# Patient Record
Sex: FEMALE | Race: WHITE | NOT HISPANIC OR LATINO | Employment: FULL TIME | URBAN - METROPOLITAN AREA
[De-identification: names, ages, dates, MRNs, and addresses within clinical notes are randomized per-mention and may not be internally consistent; named-entity substitution may affect disease eponyms.]

---

## 2023-05-20 LAB
ALANINE AMINOTRANSFERASE (SGPT) (U/L) IN SER/PLAS: 13 U/L (ref 7–45)
ALBUMIN (G/DL) IN SER/PLAS: 4.1 G/DL (ref 3.4–5)
ALKALINE PHOSPHATASE (U/L) IN SER/PLAS: 75 U/L (ref 33–110)
ANION GAP IN SER/PLAS: 10 MMOL/L (ref 10–20)
ASPARTATE AMINOTRANSFERASE (SGOT) (U/L) IN SER/PLAS: 12 U/L (ref 9–39)
BILIRUBIN TOTAL (MG/DL) IN SER/PLAS: 0.4 MG/DL (ref 0–1.2)
CALCIDIOL (25 OH VITAMIN D3) (NG/ML) IN SER/PLAS: 27 NG/ML
CALCIUM (MG/DL) IN SER/PLAS: 9.2 MG/DL (ref 8.6–10.6)
CARBON DIOXIDE, TOTAL (MMOL/L) IN SER/PLAS: 28 MMOL/L (ref 21–32)
CHLORIDE (MMOL/L) IN SER/PLAS: 107 MMOL/L (ref 98–107)
CORTISOL (UG/DL) IN SERUM - AM: 13.2 UG/DL (ref 5–20)
CREATININE (MG/DL) IN SER/PLAS: 0.96 MG/DL (ref 0.5–1.05)
DEHYDROEPIANDROSTERONE SULFATE (DHEA-S) (UG/DL) IN SER/: 169 UG/DL (ref 12–379)
ESTIMATED AVERAGE GLUCOSE FOR HBA1C: 103 MG/DL
ESTRADIOL (PG/ML) IN SER/PLAS: 52 PG/ML
FOLLITROPIN (IU/L) IN SER/PLAS: 7.6 IU/L
GFR FEMALE: 77 ML/MIN/1.73M2
GLUCOSE (MG/DL) IN SER/PLAS: 82 MG/DL (ref 74–99)
HEMOGLOBIN A1C/HEMOGLOBIN TOTAL IN BLOOD: 5.2 %
INSULIN: 11 UIU/ML (ref 3–25)
LUTEINIZING HORMONE (IU/ML) IN SER/PLAS: 7.9 IU/L
POTASSIUM (MMOL/L) IN SER/PLAS: 4.5 MMOL/L (ref 3.5–5.3)
PROLACTIN (UG/L) IN SER/PLAS: 8.9 UG/L (ref 3–20)
PROTEIN TOTAL: 6.7 G/DL (ref 6.4–8.2)
SODIUM (MMOL/L) IN SER/PLAS: 140 MMOL/L (ref 136–145)
THYROTROPIN (MIU/L) IN SER/PLAS BY DETECTION LIMIT <= 0.05 MIU/L: 2.16 MIU/L (ref 0.44–3.98)
THYROXINE (T4) FREE (NG/DL) IN SER/PLAS: 1.67 NG/DL (ref 0.78–1.48)
TRIIODOTHYRONINE (T3) (NG/DL) IN SER/PLAS: 134 NG/DL (ref 60–200)
UREA NITROGEN (MG/DL) IN SER/PLAS: 17 MG/DL (ref 6–23)

## 2023-05-24 LAB — ADRENOCORTICOTROPIC HORMONE: 24.4 PG/ML (ref 7.2–63.3)

## 2023-05-29 LAB
TESTOSTERONE FREE (CHAN): 2.9 PG/ML (ref 0.1–6.4)
TESTOSTERONE,TOTAL,LC-MS/MS: 26 NG/DL (ref 2–45)

## 2023-10-14 ENCOUNTER — LAB (OUTPATIENT)
Dept: LAB | Facility: LAB | Age: 38
End: 2023-10-14
Payer: COMMERCIAL

## 2023-10-14 DIAGNOSIS — Z00.00 ENCOUNTER FOR GENERAL ADULT MEDICAL EXAMINATION WITHOUT ABNORMAL FINDINGS: Primary | ICD-10-CM

## 2023-10-14 PROCEDURE — 84402 ASSAY OF FREE TESTOSTERONE: CPT

## 2023-10-14 PROCEDURE — 36415 COLL VENOUS BLD VENIPUNCTURE: CPT

## 2023-10-19 LAB
TESTOSTERONE FREE (CHAN): 2.7 PG/ML (ref 0.1–6.4)
TESTOSTERONE,TOTAL,LC-MS/MS: 25 NG/DL (ref 2–45)

## 2023-10-21 PROBLEM — E88.819 INSULIN RESISTANCE: Status: ACTIVE | Noted: 2023-10-21

## 2023-10-21 PROBLEM — E56.9 VITAMIN DEFICIENCY: Status: ACTIVE | Noted: 2023-10-21

## 2023-10-21 PROBLEM — N92.6 MISSED MENSES: Status: ACTIVE | Noted: 2023-10-21

## 2023-10-21 PROBLEM — E66.3 OVERWEIGHT WITH BODY MASS INDEX (BMI) OF 26 TO 26.9 IN ADULT: Status: ACTIVE | Noted: 2023-10-21

## 2023-10-21 PROBLEM — G43.009 MIGRAINE WITHOUT AURA AND WITHOUT STATUS MIGRAINOSUS, NOT INTRACTABLE: Status: ACTIVE | Noted: 2023-10-21

## 2023-10-21 PROBLEM — H18.832 RECURRENT EROSION OF CORNEA, LEFT EYE: Status: ACTIVE | Noted: 2023-10-21

## 2023-10-21 PROBLEM — Z34.90 PREGNANCY (HHS-HCC): Status: ACTIVE | Noted: 2023-10-21

## 2023-10-21 PROBLEM — N92.6 MISSED PERIOD: Status: ACTIVE | Noted: 2023-10-21

## 2023-10-21 PROBLEM — E03.9 PRIMARY HYPOTHYROIDISM: Status: ACTIVE | Noted: 2023-10-21

## 2023-10-21 PROBLEM — R68.82 DECREASED LIBIDO: Status: ACTIVE | Noted: 2023-10-21

## 2023-10-21 PROBLEM — H10.9 CONJUNCTIVITIS, LEFT EYE: Status: ACTIVE | Noted: 2023-10-21

## 2023-10-21 PROBLEM — N89.8 VAGINAL DISCHARGE: Status: ACTIVE | Noted: 2023-10-21

## 2023-10-21 PROBLEM — H26.9 CATARACT, CORTICAL, LEFT EYE: Status: ACTIVE | Noted: 2023-10-21

## 2023-10-21 PROBLEM — E27.49 ADRENAL SUPPRESSION (MULTI): Status: ACTIVE | Noted: 2023-10-21

## 2023-10-21 PROBLEM — D25.9 UTERINE FIBROID: Status: ACTIVE | Noted: 2023-10-21

## 2023-10-21 PROBLEM — F52.0 HYPOACTIVE SEXUAL DESIRE DISORDER: Status: ACTIVE | Noted: 2023-10-21

## 2023-10-21 PROBLEM — H57.89 IRRITATION OF LEFT EYE: Status: ACTIVE | Noted: 2023-10-21

## 2023-10-21 PROBLEM — N97.9 FEMALE FERTILITY PROBLEMS: Status: ACTIVE | Noted: 2023-10-21

## 2023-10-21 PROBLEM — G43.909 HEADACHE, MIGRAINE: Status: ACTIVE | Noted: 2023-10-21

## 2023-10-21 PROBLEM — F52.31 FEMALE ORGASMIC DISORDER: Status: ACTIVE | Noted: 2023-10-21

## 2023-10-21 RX ORDER — MULTIVITAMIN
TABLET ORAL
COMMUNITY
Start: 2022-12-09

## 2023-10-21 RX ORDER — FLIBANSERIN 100 MG/1
1 TABLET, FILM COATED ORAL NIGHTLY
COMMUNITY
Start: 2023-06-26 | End: 2023-12-17 | Stop reason: WASHOUT

## 2023-10-21 RX ORDER — TOPIRAMATE 25 MG/1
TABLET ORAL
COMMUNITY
Start: 2022-12-28 | End: 2023-12-01 | Stop reason: WASHOUT

## 2023-10-24 ENCOUNTER — TELEMEDICINE (OUTPATIENT)
Dept: OBSTETRICS AND GYNECOLOGY | Facility: CLINIC | Age: 38
End: 2023-10-24
Payer: COMMERCIAL

## 2023-10-24 DIAGNOSIS — Z79.890 MENOPAUSAL SYNDROME ON HORMONE REPLACEMENT THERAPY: ICD-10-CM

## 2023-10-24 DIAGNOSIS — N95.1 MENOPAUSAL SYNDROME ON HORMONE REPLACEMENT THERAPY: ICD-10-CM

## 2023-10-24 DIAGNOSIS — F52.0 HYPOACTIVE SEXUAL DESIRE DISORDER: Primary | ICD-10-CM

## 2023-10-24 PROCEDURE — 99441 PR PHYS/QHP TELEPHONE EVALUATION 5-10 MIN: CPT | Performed by: NURSE PRACTITIONER

## 2023-10-24 RX ORDER — TESTOSTERONE 20.25 MG/1.25G
GEL TOPICAL
Qty: 75 G | Refills: 0 | Status: SHIPPED | OUTPATIENT
Start: 2023-10-24

## 2023-10-24 ASSESSMENT — ENCOUNTER SYMPTOMS
PSYCHIATRIC NEGATIVE: 0
MUSCULOSKELETAL NEGATIVE: 0
GASTROINTESTINAL NEGATIVE: 0
EYES NEGATIVE: 0
RESPIRATORY NEGATIVE: 0
NEUROLOGICAL NEGATIVE: 0
ALLERGIC/IMMUNOLOGIC NEGATIVE: 0
ENDOCRINE NEGATIVE: 0
HEMATOLOGIC/LYMPHATIC NEGATIVE: 0
CONSTITUTIONAL NEGATIVE: 0
CARDIOVASCULAR NEGATIVE: 0

## 2023-10-24 NOTE — PROGRESS NOTES
Subjective   Patient ID: Tomasa Alcaraz is a 38 y.o. female who presents for Follow-up.  HPI  Addyi discontinued  Normal baseline testosterone labs; would like to try testosterone for HSDD  Contraception:  has a vasectomy  Review of Systems    Objective   Physical Exam    Assessment/Plan   Diagnoses and all orders for this visit:  Hypoactive sexual desire disorder  -     testosterone 20.25 mg/1.25 gram (1.62 %) gel in metered-dose pump; Apply one pump weekly to the back of your leg or inner thigh. Patient is aware that testosterone is not FDA approved for women and plans to use a Good Rx coupon. Please do not send a PA  Menopausal syndrome on hormone replacement therapy  -     Testosterone,Free and Total; Future

## 2023-12-01 ENCOUNTER — OFFICE VISIT (OUTPATIENT)
Dept: PRIMARY CARE | Facility: CLINIC | Age: 38
End: 2023-12-01
Payer: COMMERCIAL

## 2023-12-01 VITALS
RESPIRATION RATE: 16 BRPM | OXYGEN SATURATION: 98 % | HEART RATE: 80 BPM | HEIGHT: 64 IN | WEIGHT: 150 LBS | DIASTOLIC BLOOD PRESSURE: 78 MMHG | TEMPERATURE: 97.1 F | BODY MASS INDEX: 25.61 KG/M2 | SYSTOLIC BLOOD PRESSURE: 121 MMHG

## 2023-12-01 DIAGNOSIS — Z12.31 ENCOUNTER FOR SCREENING MAMMOGRAM FOR BREAST CANCER: ICD-10-CM

## 2023-12-01 DIAGNOSIS — E27.49 ADRENAL SUPPRESSION (MULTI): ICD-10-CM

## 2023-12-01 DIAGNOSIS — Z00.00 ANNUAL PHYSICAL EXAM: Primary | ICD-10-CM

## 2023-12-01 PROCEDURE — 99396 PREV VISIT EST AGE 40-64: CPT | Performed by: FAMILY MEDICINE

## 2023-12-01 PROCEDURE — 1036F TOBACCO NON-USER: CPT | Performed by: FAMILY MEDICINE

## 2023-12-01 ASSESSMENT — PROMIS GLOBAL HEALTH SCALE
RATE_PHYSICAL_HEALTH: GOOD
RATE_AVERAGE_FATIGUE: MILD
CARRYOUT_PHYSICAL_ACTIVITIES: COMPLETELY
CARRYOUT_SOCIAL_ACTIVITIES: VERY GOOD
RATE_QUALITY_OF_LIFE: VERY GOOD
RATE_GENERAL_HEALTH: GOOD
RATE_MENTAL_HEALTH: GOOD
RATE_AVERAGE_PAIN: 0
RATE_SOCIAL_SATISFACTION: GOOD
EMOTIONAL_PROBLEMS: SOMETIMES

## 2023-12-01 NOTE — PROGRESS NOTES
"Subjective   Patient ID: Tomasa Alcaraz is a 38 y.o. female who presents for Annual Exam.  Very pleasant 38-year-old here today for annual wellness exam  Has history of adrenal suppression sees endocrinologist  Works outside the home is not a smoker  No new family history of concern  No  issues has cycles are regular  Sees gynecologist regularly  No angina palpitations or syncope no fever chills or night sweats        Review of Systems  Constitutional: no chills, no fever and no night sweats.   Eyes: no blurred vision and no eyesight problems.   ENT: no hearing loss, no nasal congestion, no nasal discharge, no hoarseness and no sore throat.   Cardiovascular: no chest pain, no intermittent leg claudication, no lower extremity edema, no palpitations and no syncope.   Respiratory: no cough, no shortness of breath during exertion, no shortness of breath at rest and no wheezing.   Gastrointestinal: no abdominal pain, no blood in stools, no constipation, no diarrhea, no melena, no nausea, no rectal pain and no vomiting.   Genitourinary: no dysuria, no change in urinary frequency, no urinary hesitancy, no feelings of urinary urgency and no vaginal discharge.   Musculoskeletal: no arthralgias,  no back pain and no myalgias.   Integumentary: no new skin lesions and no rashes.   Neurological: no difficulty walking, no headache, no limb weakness, no numbness and no tingling.   Psychiatric: no anxiety, no depression, no anhedonia and no substance use disorders.   Endocrine: no recent weight gain and no recent weight loss.   Hematologic/Lymphatic: no tendency for easy bruising and no swollen glands .    Objective    /78 (BP Location: Right arm, Patient Position: Sitting, BP Cuff Size: Large adult)   Pulse 80   Temp 36.2 °C (97.1 °F) (Temporal)   Resp 16   Ht 1.626 m (5' 4\")   Wt 68 kg (150 lb)   SpO2 98%   BMI 25.75 kg/m²    Physical Exam  The patient appeared well nourished and normally developed. Vital signs as " documented. Head exam is unremarkable. No scleral icterus or corneal arcus noted.  Pupils are equal round reactive to light extraocular movements are intact no hemorrhages noted on funduscopic exam mouth mucous membranes are moist no exudates ears canals clear TMs are gray pearly not injected nose no rhinorrhea or epistaxis Neck is without jugular venous distension, thyromegaly, or carotid bruits. Carotid upstrokes are brisk bilaterally. Lungs are clear to auscultation and percussion. Cardiac exam reveals the PMI to be normally sized and situated. Rhythm is regular. First and second heart sounds normal. No murmurs, rubs or gallops. Abdominal exam reveals normal bowel sounds, no masses, no organomegaly and no aortic enlargement. Extremities are nonedematous and both femoral and pedal pulses are normal.  Neurologic exam DTRs are equal bilaterally no focal deficits strength is symmetrical heme lymph no palpable lymph nodes in the neck axilla or groin    Assessment/Plan   Problem List Items Addressed This Visit       Adrenal suppression (CMS/HCC)     Stable based on symptoms and exam  Continue to follow with endocrinologist         Annual physical exam - Primary     Unremarkable physical exam  Continue to follow-up once a year         Relevant Orders    Comprehensive Metabolic Panel (Completed)    Lipid Panel (Completed)    Hemoglobin A1C (Completed)    Hepatitis C antibody (Completed)    CBC (Completed)     Other Visit Diagnoses       Encounter for screening mammogram for breast cancer        Relevant Orders    BI mammo bilateral screening tomosynthesis                 Blaire Frank MD

## 2023-12-07 PROCEDURE — RXMED WILLOW AMBULATORY MEDICATION CHARGE

## 2023-12-08 ENCOUNTER — PHARMACY VISIT (OUTPATIENT)
Dept: PHARMACY | Facility: CLINIC | Age: 38
End: 2023-12-08
Payer: COMMERCIAL

## 2023-12-08 ENCOUNTER — LAB (OUTPATIENT)
Dept: LAB | Facility: LAB | Age: 38
End: 2023-12-08
Payer: COMMERCIAL

## 2023-12-08 DIAGNOSIS — N95.1 MENOPAUSAL SYNDROME ON HORMONE REPLACEMENT THERAPY: ICD-10-CM

## 2023-12-08 DIAGNOSIS — Z79.890 MENOPAUSAL SYNDROME ON HORMONE REPLACEMENT THERAPY: ICD-10-CM

## 2023-12-08 DIAGNOSIS — Z00.00 ANNUAL PHYSICAL EXAM: ICD-10-CM

## 2023-12-08 LAB
ALBUMIN SERPL BCP-MCNC: 4.3 G/DL (ref 3.4–5)
ALP SERPL-CCNC: 62 U/L (ref 33–110)
ALT SERPL W P-5'-P-CCNC: 16 U/L (ref 7–45)
ANION GAP SERPL CALC-SCNC: 11 MMOL/L (ref 10–20)
AST SERPL W P-5'-P-CCNC: 12 U/L (ref 9–39)
BILIRUB SERPL-MCNC: 0.4 MG/DL (ref 0–1.2)
BUN SERPL-MCNC: 17 MG/DL (ref 6–23)
CALCIUM SERPL-MCNC: 9.4 MG/DL (ref 8.6–10.6)
CHLORIDE SERPL-SCNC: 107 MMOL/L (ref 98–107)
CHOLEST SERPL-MCNC: 191 MG/DL (ref 0–199)
CHOLESTEROL/HDL RATIO: 4.9
CO2 SERPL-SCNC: 26 MMOL/L (ref 21–32)
CREAT SERPL-MCNC: 0.84 MG/DL (ref 0.5–1.05)
ERYTHROCYTE [DISTWIDTH] IN BLOOD BY AUTOMATED COUNT: 12.2 % (ref 11.5–14.5)
EST. AVERAGE GLUCOSE BLD GHB EST-MCNC: 94 MG/DL
GFR SERPL CREATININE-BSD FRML MDRD: 90 ML/MIN/1.73M*2
GLUCOSE SERPL-MCNC: 84 MG/DL (ref 74–99)
HBA1C MFR BLD: 4.9 %
HCT VFR BLD AUTO: 41.4 % (ref 36–46)
HCV AB SER QL: NONREACTIVE
HDLC SERPL-MCNC: 39 MG/DL
HGB BLD-MCNC: 13.9 G/DL (ref 12–16)
LDLC SERPL CALC-MCNC: 131 MG/DL
MCH RBC QN AUTO: 32.3 PG (ref 26–34)
MCHC RBC AUTO-ENTMCNC: 33.6 G/DL (ref 32–36)
MCV RBC AUTO: 96 FL (ref 80–100)
NON HDL CHOLESTEROL: 152 MG/DL (ref 0–149)
NRBC BLD-RTO: 0 /100 WBCS (ref 0–0)
PLATELET # BLD AUTO: 295 X10*3/UL (ref 150–450)
POTASSIUM SERPL-SCNC: 4.3 MMOL/L (ref 3.5–5.3)
PROT SERPL-MCNC: 6.8 G/DL (ref 6.4–8.2)
RBC # BLD AUTO: 4.31 X10*6/UL (ref 4–5.2)
SODIUM SERPL-SCNC: 140 MMOL/L (ref 136–145)
TRIGL SERPL-MCNC: 104 MG/DL (ref 0–149)
VLDL: 21 MG/DL (ref 0–40)
WBC # BLD AUTO: 5.8 X10*3/UL (ref 4.4–11.3)

## 2023-12-08 PROCEDURE — 84402 ASSAY OF FREE TESTOSTERONE: CPT

## 2023-12-08 PROCEDURE — 86803 HEPATITIS C AB TEST: CPT

## 2023-12-08 PROCEDURE — 80061 LIPID PANEL: CPT

## 2023-12-08 PROCEDURE — 80053 COMPREHEN METABOLIC PANEL: CPT

## 2023-12-08 PROCEDURE — 85027 COMPLETE CBC AUTOMATED: CPT

## 2023-12-08 PROCEDURE — 36415 COLL VENOUS BLD VENIPUNCTURE: CPT

## 2023-12-08 PROCEDURE — 83036 HEMOGLOBIN GLYCOSYLATED A1C: CPT

## 2023-12-12 LAB
TESTOSTERONE FREE (CHAN): 2 PG/ML (ref 0.1–6.4)
TESTOSTERONE,TOTAL,LC-MS/MS: 23 NG/DL (ref 2–45)

## 2023-12-15 ENCOUNTER — TELEPHONE (OUTPATIENT)
Dept: OBSTETRICS AND GYNECOLOGY | Facility: CLINIC | Age: 38
End: 2023-12-15
Payer: COMMERCIAL

## 2023-12-15 NOTE — TELEPHONE ENCOUNTER
Pt returned call  Pt verified by name and .  Pt is aware of Hunter Lincoln's message:  Nurse left message for pt to return call:  EVA Hernandez-RIN Cedeño RN  Can you please let her know that her follow up testosterone levels are within normal limits and inquire how the therapy is going?  Pt states she is not having any side effect like she did I with Addyi.  Pt is aware nurse will send Hunter Lincoln a message:

## 2023-12-15 NOTE — TELEPHONE ENCOUNTER
Nurse left message for pt to return call:  EVA Hernandez-RIN Cedeño RN  Can you please let her know that her follow up testosterone levels are within normal limits and inquire how the therapy is going?

## 2023-12-17 PROBLEM — Z00.00 ANNUAL PHYSICAL EXAM: Status: ACTIVE | Noted: 2023-12-17

## 2023-12-19 RX ORDER — DOXYCYCLINE HYCLATE 100 MG
100 TABLET ORAL 2 TIMES DAILY
COMMUNITY
Start: 2023-10-27

## 2023-12-19 RX ORDER — TOBRAMYCIN AND DEXAMETHASONE 3; 1 MG/G; MG/G
OINTMENT OPHTHALMIC
COMMUNITY
Start: 2023-10-27 | End: 2024-04-24 | Stop reason: ALTCHOICE

## 2023-12-27 ENCOUNTER — HOSPITAL ENCOUNTER (OUTPATIENT)
Dept: RADIOLOGY | Facility: HOSPITAL | Age: 38
Discharge: HOME | End: 2023-12-27
Payer: COMMERCIAL

## 2023-12-27 DIAGNOSIS — Z12.31 ENCOUNTER FOR SCREENING MAMMOGRAM FOR BREAST CANCER: ICD-10-CM

## 2023-12-27 PROCEDURE — 77063 BREAST TOMOSYNTHESIS BI: CPT

## 2023-12-27 PROCEDURE — 77067 SCR MAMMO BI INCL CAD: CPT | Performed by: RADIOLOGY

## 2023-12-27 PROCEDURE — 77063 BREAST TOMOSYNTHESIS BI: CPT | Performed by: RADIOLOGY

## 2024-01-09 ENCOUNTER — OFFICE VISIT (OUTPATIENT)
Dept: ENDOCRINOLOGY | Facility: HOSPITAL | Age: 39
End: 2024-01-09
Payer: COMMERCIAL

## 2024-01-09 VITALS
SYSTOLIC BLOOD PRESSURE: 106 MMHG | HEART RATE: 74 BPM | WEIGHT: 155.3 LBS | BODY MASS INDEX: 26.51 KG/M2 | HEIGHT: 64 IN | DIASTOLIC BLOOD PRESSURE: 78 MMHG | TEMPERATURE: 97.7 F | OXYGEN SATURATION: 97 %

## 2024-01-09 DIAGNOSIS — E56.9 VITAMIN DEFICIENCY: ICD-10-CM

## 2024-01-09 DIAGNOSIS — E03.9 PRIMARY HYPOTHYROIDISM: Primary | ICD-10-CM

## 2024-01-09 PROBLEM — E27.49 ADRENAL SUPPRESSION (MULTI): Status: RESOLVED | Noted: 2023-10-21 | Resolved: 2024-01-09

## 2024-01-09 PROCEDURE — 99215 OFFICE O/P EST HI 40 MIN: CPT | Performed by: STUDENT IN AN ORGANIZED HEALTH CARE EDUCATION/TRAINING PROGRAM

## 2024-01-09 PROCEDURE — 1036F TOBACCO NON-USER: CPT | Performed by: STUDENT IN AN ORGANIZED HEALTH CARE EDUCATION/TRAINING PROGRAM

## 2024-01-09 ASSESSMENT — PAIN SCALES - GENERAL: PAINLEVEL: 0-NO PAIN

## 2024-01-09 ASSESSMENT — PATIENT HEALTH QUESTIONNAIRE - PHQ9
SUM OF ALL RESPONSES TO PHQ9 QUESTIONS 1 & 2: 0
2. FEELING DOWN, DEPRESSED OR HOPELESS: NOT AT ALL
1. LITTLE INTEREST OR PLEASURE IN DOING THINGS: NOT AT ALL

## 2024-01-09 ASSESSMENT — ENCOUNTER SYMPTOMS
LOSS OF SENSATION IN FEET: 0
OCCASIONAL FEELINGS OF UNSTEADINESS: 0
DEPRESSION: 0

## 2024-01-09 NOTE — PROGRESS NOTES
"Subjective   Tomasa Alcaraz is a 38 y.o. female with hx of hypothyroidism coming in for follow up.  Patient used to follow with Dr. Hooks  On Lt4 112 mcg 8 pills a week takes it appropriately  Started on testosterone by Gynecology for low libido (Androgel once a week one pump) Labs checked 2 days after  Was having headaches saw neurology had MRI negative took migraine treatment   Was previously on topiramate but was not feeling good on it  Constantly tired  Vitamin D 2000IU daily  Energy:  No naps feels tired   Sleep: 9 hours of sleep. Wakes up twice a night   Weight: Lost 11 lbs from June to December gained 5 lbs back  BM:No constipation or diarrhea   Cold or heat intolerance: Same  Palpitations or tremors: Occasional palpitations Not always with anxiety  Menses or HF or NS: Regular period. No HF no night sweats  Cramps: No  Tingling numbness: No  Compressive symptoms:  - Hoarseness: No  - SOB while lying flat: No  - Dysphagia: No  Family history of thyroid cancer: No   History of head or neck radiation: No    No family hx of thyroid disease or autoimmune disease  Breast cancer in her father's family    Review of Systems  all pertinent systems reviewed and are otherwise negative   Objective   /78 (BP Location: Left arm, Patient Position: Sitting, BP Cuff Size: Adult)   Pulse 74   Temp 36.5 °C (97.7 °F) (Temporal)   Ht 1.626 m (5' 4\")   Wt 70.4 kg (155 lb 4.8 oz)   SpO2 97%   BMI 26.66 kg/m²    Physical Exam  Constitutional:       General: She is not in acute distress.     Appearance: Normal appearance.   Eyes:      Extraocular Movements: Extraocular movements intact.      Pupils: Pupils are equal, round, and reactive to light.   Cardiovascular:      Rate and Rhythm: Normal rate and regular rhythm.   Pulmonary:      Effort: Pulmonary effort is normal. No respiratory distress.      Breath sounds: Normal breath sounds.   Abdominal:      General: Bowel sounds are normal.      Palpations: Abdomen is soft.    "   Tenderness: There is no abdominal tenderness.   Skin:     Coloration: Skin is not jaundiced or pale.      Findings: No erythema or rash.   Neurological:      General: No focal deficit present.      Mental Status: She is alert and oriented to person, place, and time.      Deep Tendon Reflexes: Reflexes normal.      Comments: Slight tremors    Psychiatric:         Mood and Affect: Mood normal.         Behavior: Behavior normal.         Lab Results   Component Value Date    TSH 2.16 05/20/2023    FREET4 1.67 (H) 05/20/2023      Latest Reference Range & Units 05/20/23 08:06 10/14/23 10:23 12/08/23 10:37   FOLLICLE STIMULATING HORMONE IU/L 7.6     Hemoglobin A1C see below % 5.2  4.9   Thyroxine, Free 0.78 - 1.48 ng/dL 1.67 (H)     Triiodothyronine 60 - 200 ng/dL 134     Insulin 3 - 25 uIU/mL 11     LH IU/L 7.9     PROLACTIN 3.0 - 20.0 ug/L 8.9     Thyroid Stimulating Hormone 0.44 - 3.98 mIU/L 2.16     Vitamin D, 25-Hydroxy, Total ng/mL 27 !     DHEA Sulfate 12 - 379 ug/dL 169     Estradiol pg/mL 52     Testosterone, Free 0.1 - 6.4 pg/mL 2.9 2.7 2.0   GLUCOSE 74 - 99 mg/dL 82  84   Cortisol  A.M. 5.0 - 20.0 ug/dL 13.2     Estimated Average Glucose Not Established mg/dL 103  94   Testosterone, Total, LC-MS/MS 2 - 45 ng/dL 26 25 23   Adrenocorticotropic Hormone (ACTH) 7.2 - 63.3 pg/mL 24.4     (H): Data is abnormally high  !: Data is abnormal  Assessment/Plan   Mrs. Alcaraz is a 38 year old F with hx of hypothyroidism coming for follow up  Patient used to follow with Dr. Hooks  On Lt4 112 mcg 8 pills a week takes it appropriately  Started on testosterone by Gynecology for low libido (Androgel once a week one pump) Labs checked 2 days after  Was having headaches saw neurology had MRI negative took migraine treatment   Was previously on topiramate but was not feeling good on it  Constantly tired  Vitamin D 2000IU daily  Energy:  No naps feels tired   Sleep: 9 hours of sleep. Wakes up twice a night   Weight: Lost 11 lbs from  June to December gained 5 lbs back  Last TSH 2.16 5/20/2023  Plan:  Continue Levothyroxine 112 mcg daily except Sunday take 2 pills  to be taken on an empty stomach on its own 30min before other meds or food   Continue vitamin D 2000 international units  daily   Blood work when you can    RTC in 6-8 months

## 2024-01-09 NOTE — PATIENT INSTRUCTIONS
Continue Levothyroxine 112 mcg daily except Sunday take 2 pills  to be taken on an empty stomach on its own 30min before other meds or food   Continue vitamin D 2000 international units  daily   Blood work when you can    RTC in 6-8 months

## 2024-01-20 DIAGNOSIS — R92.8 ABNORMAL MAMMOGRAM: Primary | ICD-10-CM

## 2024-01-25 ENCOUNTER — HOSPITAL ENCOUNTER (OUTPATIENT)
Dept: RADIOLOGY | Facility: HOSPITAL | Age: 39
Discharge: HOME | End: 2024-01-25
Payer: COMMERCIAL

## 2024-01-25 DIAGNOSIS — R92.8 ABNORMAL MAMMOGRAM: ICD-10-CM

## 2024-01-25 PROCEDURE — 76642 ULTRASOUND BREAST LIMITED: CPT | Mod: LEFT SIDE | Performed by: RADIOLOGY

## 2024-01-25 PROCEDURE — 76981 USE PARENCHYMA: CPT | Mod: LT,RT

## 2024-01-25 PROCEDURE — 77061 BREAST TOMOSYNTHESIS UNI: CPT | Mod: LEFT SIDE | Performed by: RADIOLOGY

## 2024-01-25 PROCEDURE — 77065 DX MAMMO INCL CAD UNI: CPT | Mod: LEFT SIDE | Performed by: RADIOLOGY

## 2024-01-25 PROCEDURE — 77061 BREAST TOMOSYNTHESIS UNI: CPT | Mod: LT

## 2024-01-25 PROCEDURE — 76642 ULTRASOUND BREAST LIMITED: CPT | Mod: LT

## 2024-01-31 ENCOUNTER — OFFICE VISIT (OUTPATIENT)
Dept: NEUROLOGY | Facility: CLINIC | Age: 39
End: 2024-01-31
Payer: COMMERCIAL

## 2024-01-31 ENCOUNTER — TELEPHONE (OUTPATIENT)
Dept: PRIMARY CARE | Facility: CLINIC | Age: 39
End: 2024-01-31

## 2024-01-31 VITALS
BODY MASS INDEX: 26.66 KG/M2 | SYSTOLIC BLOOD PRESSURE: 109 MMHG | HEART RATE: 60 BPM | DIASTOLIC BLOOD PRESSURE: 70 MMHG | HEIGHT: 64 IN

## 2024-01-31 DIAGNOSIS — G43.109 MIGRAINE WITH VISUAL AURA: Primary | ICD-10-CM

## 2024-01-31 DIAGNOSIS — R92.8 ABNORMAL MAMMOGRAM: Primary | ICD-10-CM

## 2024-01-31 PROCEDURE — RXMED WILLOW AMBULATORY MEDICATION CHARGE

## 2024-01-31 PROCEDURE — 1036F TOBACCO NON-USER: CPT | Performed by: PSYCHIATRY & NEUROLOGY

## 2024-01-31 PROCEDURE — 99213 OFFICE O/P EST LOW 20 MIN: CPT | Performed by: PSYCHIATRY & NEUROLOGY

## 2024-01-31 RX ORDER — RIZATRIPTAN BENZOATE 10 MG/1
10 TABLET ORAL ONCE AS NEEDED
Qty: 9 TABLET | Refills: 5 | Status: SHIPPED | OUTPATIENT
Start: 2024-01-31 | End: 2024-05-26

## 2024-01-31 NOTE — PATIENT INSTRUCTIONS
I'm glad to hear that your migraine headache frequency remains low.    However, we discussed that naratriptan often takes about an hour to work.  I recommend trying a different triptan to see if it will kick in more quickly for headache relief.    I am prescribing rizatriptan (Maxalt) 10 mg.  Take this as you would take naratriptan, at the onset of headache (or the onset of visual aura).  You can repeat a dose after 2 hours if the first dose is not adequately effective.  Do not take more than 3 doses in 24 hours.    Contact my office if rizatriptan is not effective or not well-tolerated.  There are several other options, or we can go back to naratriptan if you prefer.    Otherwise please see me in 8 months.

## 2024-01-31 NOTE — PROGRESS NOTES
Nilton Alcaraz is a 38 y.o. year old female seen in follow-up for migraine with and without visual aura.    HPI    38-year-old right-handed  woman with past medical history significant for Hashimoto's thyroiditis with associated hypothyroidism on replacement, vitamin D deficiency, childbirth x2.     I evaluated her initially and most recently on 5/22/2023.  As detailed in my ambulatory EMR note from that date she has a history of headaches dating to high school.  She has experienced a relatively low headache frequency and at the time of the initial visit did not endorse aura.  She seems to recall a sublingual triptan trial (unclear which agent) that she found too strong and accordingly I recommended a trial of naratriptan 1 mg strength.  Her frequency of headache was not to the point of warranting a daily preventive agent.    At the time I saw her initially she was pending a brain MRI recommended by her endocrinologist due to hormonal dysfunction with question of a pituitary process.  The MRI was an unremarkable study.    She is evaluated again today in the office.    She continues to experience a low headache frequency of roughly 3 days/month that require use of a triptan.  Naratriptan 1 mg has some efficacy but she indicates that it takes about an hour for it to work.  She does not perceive drowsiness or other noted side effects although her migraines in general make her drowsy.    Identified headache triggers include particularly stress and lack of sleep.  Her last bad exacerbation occurred after a long shift with inadequate sleep.    She has had occasional instances on further questioning today corresponding to visual aura.  This includes the most recent bad headache during which she noted darkening of the OS temporal hemifield for about 1-2 minutes.  She seems to recall occasional similar visual aura episodes in the past.    She is not taking nutraceuticals or conventional preventive  headache medications.    She denies visual complaints apart from during the aura.  She denies episodes of vertigo.    On further questioning she indicates never having tried any other triptan than naratriptan, and is fairly certain that the medication we discussed at the initial visit which she found too strong was a formulation of topiramate.    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    Cataract, cortical, left eye    Decreased libido    Female fertility problems    Headache, migraine    Hypoactive sexual desire disorder    Insulin resistance    Conjunctivitis, left eye    Irritation of left eye    Migraine without aura and without status migrainosus, not intractable    Missed period    Primary hypothyroidism    Recurrent erosion of cornea, left eye    Uterine fibroid    Vaginal discharge    Vitamin deficiency    Pregnancy    Overweight with body mass index (BMI) of 26 to 26.9 in adult    Missed menses    Annual physical exam     Past Medical History:   Diagnosis Date    Disease of thyroid gland 2011    Personal history of other endocrine, nutritional and metabolic disease 06/02/2014    History of Hashimoto thyroiditis     Past Surgical History:   Procedure Laterality Date    OTHER SURGICAL HISTORY  06/02/2014    Dental Surgery    WISDOM TOOTH EXTRACTION  2005     Social History     Tobacco Use    Smoking status: Never    Smokeless tobacco: Never   Substance Use Topics    Alcohol use: Yes     Comment: Social very infrequent     family history includes Diabetes in her father; Heart disease in her father; Hypertension in her father; Mental illness in her mother.    Current Outpatient Medications:     cholecalciferol (Vitamin D-3) 50 MCG (2000 UT) tablet, TAKE 1 TABLET BY MOUTH DAILY, Disp: 30 tablet, Rfl: 6    doxycycline (Vibra-Tabs) 100 mg tablet, Take 1 tablet (100 mg) by mouth 2 times a day., Disp: , Rfl:     levothyroxine  (Synthroid, Levoxyl) 112 mcg tablet, TAKE 1 TABLET BY MOUTH ON MONDAYS THROUGH FRIDAYS AND 2 TABLETS BY MOUTH ON WEEKENDS., Disp: 114 tablet, Rfl: 2    multivitamin (Daily Multi-Vitamin) tablet, Multi-Vitamin Daily TABS Refills: 0  Start: 9-Dec-2022, Disp: , Rfl:     naratriptan (Amerge) 1 mg tablet, TAKE 1 TABLET BY MOUTH AT ONSET OF HEADACHE, MAY REPEAT ONCE AFTER 4 HOURS., Disp: 9 tablet, Rfl: 2    testosterone 20.25 mg/1.25 gram (1.62 %) gel in metered-dose pump, Apply one pump weekly to the back of your leg or inner thigh. Patient is aware that testosterone is not FDA approved for women and plans to use a Good Rx coupon. Please do not send a PA, Disp: 75 g, Rfl: 0    Tobradex ophthalmic ointment, APPLY TO AFFECTED AREA OF BOTH EYES TWICE DAILY FOR 1 WEEK, Disp: , Rfl:   No Known Allergies    Objective   Neurological Exam  Physical Exam    Physical Examination:    General: Alert woman who was ambulatory without assistive devices.      Cranial Nerves:  Funduscopic exam revealed sharp temporal disc margins bilaterally.  Pupils were equal, round and reactive to light with no relative afferent pupillary defect.  Extraocular movements were intact and conjugate without nystagmus.  No ptosis.  Visual fields were full to confrontation tested binocularly.   Facial motor function was symmetrically intact.  Hearing was grossly intact.  No dysarthria.  Shoulder shrug was symmetric.  Tongue protrusion was midline.    Motor: There was no pronator drift.     Coordination: No postural or rest tremor, myoclonus or dystonic posturing.    Sensation: Romberg sign was absent.     Station: Intact and stable.    Gait: Stable and unremarkable.  Intact tandem.      Assessment/Plan     We discussed that on the one hand she remains in an infrequent migraine pattern with only about 3 headaches needing treatment per month.    On the other hand, she typically needs to wait about an hour for relief from naratriptan.  I discussed that it is  possible she would get quicker relief from a different triptan.  She was interested in trying a different agent and I suggested rizatriptan 10 mg non-dissolving tablets.    I reviewed that rizatriptan can be repeated after 2 hours if not effective, and that she should not exceed 30 mg in 24 hours.    I advised her to contact the office should she note side effects or lack of efficacy from rizatriptan.    Incidentally noted today is that she does have occasional migraine attacks accompanied by a brief visual aura.  At the initial visit here I did not elicit a history of aura.    I recommended she follow-up in 8 months.

## 2024-02-03 ENCOUNTER — LAB (OUTPATIENT)
Dept: LAB | Facility: LAB | Age: 39
End: 2024-02-03
Payer: COMMERCIAL

## 2024-02-03 DIAGNOSIS — E56.9 VITAMIN DEFICIENCY: ICD-10-CM

## 2024-02-03 DIAGNOSIS — E03.9 PRIMARY HYPOTHYROIDISM: ICD-10-CM

## 2024-02-03 PROCEDURE — 84443 ASSAY THYROID STIM HORMONE: CPT

## 2024-02-03 PROCEDURE — 83970 ASSAY OF PARATHORMONE: CPT

## 2024-02-03 PROCEDURE — 80069 RENAL FUNCTION PANEL: CPT

## 2024-02-03 PROCEDURE — 36415 COLL VENOUS BLD VENIPUNCTURE: CPT

## 2024-02-03 PROCEDURE — 84439 ASSAY OF FREE THYROXINE: CPT

## 2024-02-04 LAB
ALBUMIN SERPL BCP-MCNC: 4.6 G/DL (ref 3.4–5)
ANION GAP SERPL CALC-SCNC: 13 MMOL/L (ref 10–20)
BUN SERPL-MCNC: 14 MG/DL (ref 6–23)
CALCIUM SERPL-MCNC: 9.8 MG/DL (ref 8.6–10.6)
CHLORIDE SERPL-SCNC: 107 MMOL/L (ref 98–107)
CO2 SERPL-SCNC: 24 MMOL/L (ref 21–32)
CREAT SERPL-MCNC: 0.92 MG/DL (ref 0.5–1.05)
EGFRCR SERPLBLD CKD-EPI 2021: 81 ML/MIN/1.73M*2
GLUCOSE SERPL-MCNC: 78 MG/DL (ref 74–99)
PHOSPHATE SERPL-MCNC: 2.8 MG/DL (ref 2.5–4.9)
POTASSIUM SERPL-SCNC: 4.4 MMOL/L (ref 3.5–5.3)
PTH-INTACT SERPL-MCNC: 56.5 PG/ML (ref 18.5–88)
SODIUM SERPL-SCNC: 140 MMOL/L (ref 136–145)
T4 FREE SERPL-MCNC: 1.66 NG/DL (ref 0.78–1.48)
TSH SERPL-ACNC: 3.04 MIU/L (ref 0.44–3.98)

## 2024-02-06 ENCOUNTER — PHARMACY VISIT (OUTPATIENT)
Dept: PHARMACY | Facility: CLINIC | Age: 39
End: 2024-02-06
Payer: COMMERCIAL

## 2024-02-20 DIAGNOSIS — E03.9 PRIMARY HYPOTHYROIDISM: Primary | ICD-10-CM

## 2024-02-28 PROCEDURE — RXMED WILLOW AMBULATORY MEDICATION CHARGE

## 2024-03-04 ENCOUNTER — PHARMACY VISIT (OUTPATIENT)
Dept: PHARMACY | Facility: CLINIC | Age: 39
End: 2024-03-04
Payer: COMMERCIAL

## 2024-03-04 PROCEDURE — RXMED WILLOW AMBULATORY MEDICATION CHARGE

## 2024-04-24 ENCOUNTER — OFFICE VISIT (OUTPATIENT)
Dept: PRIMARY CARE | Facility: CLINIC | Age: 39
End: 2024-04-24
Payer: COMMERCIAL

## 2024-04-24 VITALS
HEART RATE: 72 BPM | HEIGHT: 64 IN | DIASTOLIC BLOOD PRESSURE: 77 MMHG | WEIGHT: 155.4 LBS | SYSTOLIC BLOOD PRESSURE: 134 MMHG | BODY MASS INDEX: 26.53 KG/M2 | TEMPERATURE: 98.7 F

## 2024-04-24 DIAGNOSIS — R39.9 UTI SYMPTOMS: Primary | ICD-10-CM

## 2024-04-24 LAB
POC APPEARANCE, URINE: CLEAR
POC BILIRUBIN, URINE: NEGATIVE
POC BLOOD, URINE: NEGATIVE
POC COLOR, URINE: YELLOW
POC GLUCOSE, URINE: NEGATIVE MG/DL
POC KETONES, URINE: NEGATIVE MG/DL
POC LEUKOCYTES, URINE: ABNORMAL
POC NITRITE,URINE: NEGATIVE
POC PH, URINE: 7 PH
POC PROTEIN, URINE: NEGATIVE MG/DL
POC SPECIFIC GRAVITY, URINE: 1.02
POC UROBILINOGEN, URINE: 0.2 EU/DL

## 2024-04-24 PROCEDURE — 81003 URINALYSIS AUTO W/O SCOPE: CPT | Performed by: FAMILY MEDICINE

## 2024-04-24 PROCEDURE — 1036F TOBACCO NON-USER: CPT | Performed by: FAMILY MEDICINE

## 2024-04-24 PROCEDURE — 87086 URINE CULTURE/COLONY COUNT: CPT

## 2024-04-24 PROCEDURE — 99213 OFFICE O/P EST LOW 20 MIN: CPT | Performed by: FAMILY MEDICINE

## 2024-04-24 RX ORDER — NITROFURANTOIN 25; 75 MG/1; MG/1
100 CAPSULE ORAL 2 TIMES DAILY
Qty: 14 CAPSULE | Refills: 0 | Status: SHIPPED | OUTPATIENT
Start: 2024-04-24 | End: 2024-05-01

## 2024-04-24 ASSESSMENT — ENCOUNTER SYMPTOMS
CHILLS: 0
NAUSEA: 0
HEMATURIA: 0
VOMITING: 0
SWEATS: 0
FREQUENCY: 1
DYSURIA: 1
FLANK PAIN: 0

## 2024-04-24 ASSESSMENT — PATIENT HEALTH QUESTIONNAIRE - PHQ9
SUM OF ALL RESPONSES TO PHQ9 QUESTIONS 1 AND 2: 0
2. FEELING DOWN, DEPRESSED OR HOPELESS: NOT AT ALL
1. LITTLE INTEREST OR PLEASURE IN DOING THINGS: NOT AT ALL

## 2024-04-24 NOTE — PROGRESS NOTES
"Subjective   Patient ID: Tomasa Alcaraz is a 39 y.o. female who presents for UTI (Twice has had pelvic pressure, dysuria.  Also noticed passing \"tissue\" and having loss of bladder control. ).  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 The current episode started in the past 7 days. The problem occurs intermittently. The problem has been gradually improving. The quality of the pain is described as burning. The pain is at a severity of 4/10. There has been no fever. She is Sexually active. There is No history of pyelonephritis. Associated symptoms include a discharge, frequency and urgency. Pertinent negatives include no chills, flank pain, hematuria, hesitancy, nausea, possible pregnancy, sweats or vomiting.       Review of Systems   Constitutional:  Negative for chills.   Gastrointestinal:  Negative for nausea and vomiting.   Genitourinary:  Positive for dysuria, frequency and urgency. Negative for flank pain, hematuria and hesitancy.   Constitutional: no chills, no fever and no night sweats.   Eyes: no blurred vision and no eyesight problems.   ENT: no hearing loss, no nasal congestion, no nasal discharge, no hoarseness and no sore throat.   Cardiovascular: no chest pain, no intermittent leg claudication, no lower extremity edema, no palpitations and no syncope.   Respiratory: no cough, no shortness of breath during exertion, no shortness of breath at rest and no wheezing.   Gastrointestinal: no abdominal pain, no blood in stools, no constipation, no diarrhea, no melena, no nausea, no rectal pain and no vomiting.   Genitourinary: no dysuria, no change in urinary frequency, no urinary hesitancy, no feelings of urinary urgency and no vaginal discharge.   Musculoskeletal: no arthralgias,  no back pain and no myalgias.   Integumentary: no new skin " "lesions and no rashes.   Neurological: no difficulty walking, no headache, no limb weakness, no numbness and no tingling.   Psychiatric: no anxiety, no depression, no anhedonia and no substance use disorders.   Endocrine: no recent weight gain and no recent weight loss.   Hematologic/Lymphatic: no tendency for easy bruising and no swollen glands .      Objective    /77   Pulse 72   Temp 37.1 °C (98.7 °F)   Ht 1.626 m (5' 4\")   Wt 70.5 kg (155 lb 6.4 oz)   BMI 26.67 kg/m²    Physical Exam  The patient appeared well nourished and normally developed. Vital signs as documented. Head exam is unremarkable. No scleral icterus or corneal arcus noted.  Pupils are equal round reactive to light extraocular movements are intact no hemorrhages noted on funduscopic exam mouth mucous membranes are moist no exudates ears canals clear TMs are gray pearly not injected nose no rhinorrhea or epistaxis Neck is without jugular venous distension, thyromegaly, or carotid bruits. Carotid upstrokes are brisk bilaterally. Lungs are clear to auscultation and percussion. Cardiac exam reveals the PMI to be normally sized and situated. Rhythm is regular. First and second heart sounds normal. No murmurs, rubs or gallops. Abdominal exam reveals normal bowel sounds, no masses, no organomegaly and no aortic enlargement. Extremities are nonedematous and both femoral and pedal pulses are normal.  Neurologic exam DTRs are equal bilaterally no focal deficits strength is symmetrical heme lymph no palpable lymph nodes in the neck axilla or groin    Assessment/Plan   Problem List Items Addressed This Visit       UTI symptoms - Primary    Relevant Orders    POCT UA Automated manually resulted (Completed)    Urine Culture (Completed)   Differential diagnosis includes UTI  Send urine for culture  Drink fluids liberally  Begin nitrofurantoin  Close observation and follow-up         Blaire Frank MD  "

## 2024-04-26 LAB — BACTERIA UR CULT: NORMAL

## 2024-05-15 PROBLEM — R39.9 UTI SYMPTOMS: Status: ACTIVE | Noted: 2024-05-15

## 2024-05-21 PROCEDURE — RXMED WILLOW AMBULATORY MEDICATION CHARGE

## 2024-05-23 ENCOUNTER — PHARMACY VISIT (OUTPATIENT)
Dept: PHARMACY | Facility: CLINIC | Age: 39
End: 2024-05-23
Payer: COMMERCIAL

## 2024-07-02 DIAGNOSIS — E03.9 PRIMARY HYPOTHYROIDISM: ICD-10-CM

## 2024-07-05 ENCOUNTER — PHARMACY VISIT (OUTPATIENT)
Dept: PHARMACY | Facility: CLINIC | Age: 39
End: 2024-07-05
Payer: COMMERCIAL

## 2024-07-05 PROCEDURE — RXMED WILLOW AMBULATORY MEDICATION CHARGE

## 2024-07-05 RX ORDER — LEVOTHYROXINE SODIUM 112 UG/1
TABLET ORAL
Qty: 114 TABLET | Refills: 3 | Status: SHIPPED | OUTPATIENT
Start: 2024-07-05

## 2024-07-17 ENCOUNTER — APPOINTMENT (OUTPATIENT)
Dept: ENDOCRINOLOGY | Facility: CLINIC | Age: 39
End: 2024-07-17
Payer: COMMERCIAL

## 2024-07-17 VITALS
SYSTOLIC BLOOD PRESSURE: 117 MMHG | BODY MASS INDEX: 28.35 KG/M2 | WEIGHT: 160 LBS | DIASTOLIC BLOOD PRESSURE: 84 MMHG | HEART RATE: 58 BPM | HEIGHT: 63 IN

## 2024-07-17 DIAGNOSIS — E03.9 HYPOTHYROIDISM, UNSPECIFIED TYPE: Primary | ICD-10-CM

## 2024-07-17 DIAGNOSIS — E03.9 PRIMARY HYPOTHYROIDISM: ICD-10-CM

## 2024-07-17 PROCEDURE — 99214 OFFICE O/P EST MOD 30 MIN: CPT | Performed by: STUDENT IN AN ORGANIZED HEALTH CARE EDUCATION/TRAINING PROGRAM

## 2024-07-17 PROCEDURE — 3008F BODY MASS INDEX DOCD: CPT | Performed by: STUDENT IN AN ORGANIZED HEALTH CARE EDUCATION/TRAINING PROGRAM

## 2024-07-17 PROCEDURE — 1036F TOBACCO NON-USER: CPT | Performed by: STUDENT IN AN ORGANIZED HEALTH CARE EDUCATION/TRAINING PROGRAM

## 2024-07-17 ASSESSMENT — PAIN SCALES - GENERAL: PAINLEVEL: 0-NO PAIN

## 2024-07-17 NOTE — PROGRESS NOTES
"Reason for visit: Primary hypothyroidism follow-up    HPI:  Tomasa Alcaraz is a 39 y.o. female with hx of hypothyroidism coming in for follow up.    -Patient used to follow with Dr. Hooks (Last appointment 5/19/2023)  -Last seen by us on 1/09/2024. She was continued on the same dose of LT4 112 mcg 8 pills per week and was advised to take Vit d 2000 international units.   -Interval history: Labs obtained on 2/3 showed an elevated FT4 level at 1.66 and TSH of 3.04 (Patient shared that she typically gets her labs drawn ~ 1 hour before her blood work)  -Current regimen: On LT4 112 mcg 8 pills a week takes it appropriately. Not taking vit D   -Was Started on testosterone by Gynecology for low libido (Androgel once a week one pump) but she is currently not taking it. Reported lack of efficacy and concerns for potential side effect    In terms of ROS:  -Reports constant fatigue.  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  Reports poor sleep quality.  Frequently interrupted by her puppy  -Stable appetite and weight (gain 5 pounds since her last appointment).  -No ocular sx- tearing, gritty sensation, itching  -No compressive symptoms: Hoarseness, trouble swallowing, or shortness of breath lying flat  -No change in bowel habits.  BM every other day without straining  -Denies muscle cramps or tingling  -Denies any hot flashes, change in libido, night sweats  -Reports regular menstrual cycles  -No nausea/vomit or abdominal pain  -No exposure to radiation  -No biotin, or contrast Exposure    Family history of thyroid cancer: No   History of head or neck radiation: No    No family hx of thyroid disease or autoimmune disease  Breast cancer in her father's family    Review of Systems  all pertinent systems reviewed and are otherwise negative   Objective   /84   Pulse 58   Ht 1.6 m (5' 3\")   Wt 72.6 kg " (160 lb)   BMI 28.34 kg/m²    Physical Exam  Constitutional:       General: She is not in acute distress.     Appearance: Normal appearance.   HENT:      Head:      Comments: Positive Chvostek sign  Eyes:      Extraocular Movements: Extraocular movements intact.      Pupils: Pupils are equal, round, and reactive to light.   Cardiovascular:      Rate and Rhythm: Normal rate and regular rhythm.   Pulmonary:      Effort: Pulmonary effort is normal. No respiratory distress.      Breath sounds: Normal breath sounds.   Abdominal:      General: Bowel sounds are normal.      Palpations: Abdomen is soft.      Tenderness: There is no abdominal tenderness.   Musculoskeletal:      Cervical back: Neck supple.   Skin:     Coloration: Skin is not jaundiced or pale.      Findings: No erythema or rash.   Neurological:      General: No focal deficit present.      Mental Status: She is alert and oriented to person, place, and time.      Deep Tendon Reflexes: Reflexes normal.      Comments: No tremors   Psychiatric:         Mood and Affect: Mood normal.         Behavior: Behavior normal.       Lab Results   Component Value Date    TSH 3.04 02/03/2024    TSH 2.16 05/20/2023    TSH 2.03 12/10/2022    TSH 1.44 10/22/2022    TSH 1.62 08/04/2021    TSH 3.32 05/22/2020    TSH 2.31 12/14/2018    TSH 0.92 04/18/2018    TSH 1.07 10/11/2017    FREET4 1.66 (H) 02/03/2024    FREET4 1.67 (H) 05/20/2023    FREET4 1.81 (H) 12/10/2022    FREET4 2.07 (H) 10/22/2022    FREET4 1.54 (H) 08/04/2021    FREET4 1.37 12/14/2018    FREET4 1.40 04/18/2018    B0XBOIA 134 05/20/2023    I5DMJEY 150 10/22/2022    I6ZYXBF 118 08/04/2021    X0RARCX 105 12/14/2018    Q1FAKIL 61 04/18/2018    TSI <1.0 04/18/2018    THYROIDPAB 16 12/14/2018    THYROIDPAB <10 04/18/2018    THYROGLOBULI <20 04/18/2018     Lab Results   Component Value Date    ANIONGAP 13 02/03/2024    BUN 14 02/03/2024    CO2 24 02/03/2024    CREATININE 0.92 02/03/2024    K 4.4 02/03/2024      02/03/2024     02/03/2024    PHOS 2.8 02/03/2024    GLUCOSE 78 02/03/2024    CALCIUM 9.8 02/03/2024    EGFR 81 02/03/2024    ALBUMIN 4.6 02/03/2024    PTH 56.5 02/03/2024    VITD25 27 (A) 05/20/2023       Lab Results   Component Value Date    CORTISOL 13.2 05/20/2023    ACTH 24.4 05/20/2023    TSH 3.04 02/03/2024    FREET4 1.66 (H) 02/03/2024    FSH 7.6 05/20/2023    LH 7.9 05/20/2023    PROLACTIN 8.9 05/20/2023       Assessment/Plan   Tomasa Alcaraz is a 39 y.o. female with hx of hypothyroidism coming in for follow up.    For hypothyroidism:  Clinically patient appears euthyroid.  She continues to complain of constant fatigue.  TSH was noted to be mildly higher than her baseline.  In the setting of her fatigue may consider uptitrating her dose to target TSH of approximately 2.  Of note, her free T4 is elevated likely due to checking blood work close to medication administration  -Continue Levothyroxine 112 mcg daily except Sunday take 2 pills  to be taken on an empty stomach on its own 30min before other meds or food   -Obtain TSH and free T4    Positive Chvostek sign:  Likely in the setting of vitamin D deficiency/insufficiency.  Patient history of vitamin D deficiency with last level checked in May 2023 at 27.  Last PTH was 56 with a corrected calcium of approximately 9.2.  She is currently denying any muscle cramps, numbness or tingling.  - RESTART vitamin D 2000 international units daily, counseled patient on appropriate sun exposure during the summertime  - Obtain RFP and PTH    RTC in 6-8 months      Case seen, examined, and discussed with Dr. Schmidt

## 2024-07-18 ENCOUNTER — LAB (OUTPATIENT)
Dept: LAB | Facility: LAB | Age: 39
End: 2024-07-18
Payer: COMMERCIAL

## 2024-07-18 DIAGNOSIS — E03.9 HYPOTHYROIDISM, UNSPECIFIED TYPE: ICD-10-CM

## 2024-07-18 DIAGNOSIS — E03.9 PRIMARY HYPOTHYROIDISM: ICD-10-CM

## 2024-07-18 LAB
ALBUMIN SERPL BCP-MCNC: 4.3 G/DL (ref 3.4–5)
ANION GAP SERPL CALC-SCNC: 9 MMOL/L (ref 10–20)
BUN SERPL-MCNC: 17 MG/DL (ref 6–23)
CALCIUM SERPL-MCNC: 8.9 MG/DL (ref 8.6–10.6)
CHLORIDE SERPL-SCNC: 106 MMOL/L (ref 98–107)
CO2 SERPL-SCNC: 28 MMOL/L (ref 21–32)
CREAT SERPL-MCNC: 0.91 MG/DL (ref 0.5–1.05)
EGFRCR SERPLBLD CKD-EPI 2021: 81 ML/MIN/1.73M*2
GLUCOSE SERPL-MCNC: 81 MG/DL (ref 74–99)
PHOSPHATE SERPL-MCNC: 2.8 MG/DL (ref 2.5–4.9)
POTASSIUM SERPL-SCNC: 4.2 MMOL/L (ref 3.5–5.3)
PTH-INTACT SERPL-MCNC: 67.5 PG/ML (ref 18.5–88)
SODIUM SERPL-SCNC: 139 MMOL/L (ref 136–145)
T4 FREE SERPL-MCNC: 1.98 NG/DL (ref 0.78–1.48)
TSH SERPL-ACNC: 2.71 MIU/L (ref 0.44–3.98)

## 2024-07-18 PROCEDURE — 83970 ASSAY OF PARATHORMONE: CPT

## 2024-07-18 PROCEDURE — 84439 ASSAY OF FREE THYROXINE: CPT

## 2024-07-18 PROCEDURE — 80069 RENAL FUNCTION PANEL: CPT

## 2024-07-18 PROCEDURE — 36415 COLL VENOUS BLD VENIPUNCTURE: CPT

## 2024-07-18 PROCEDURE — 84443 ASSAY THYROID STIM HORMONE: CPT

## 2024-07-30 ENCOUNTER — HOSPITAL ENCOUNTER (OUTPATIENT)
Dept: RADIOLOGY | Facility: HOSPITAL | Age: 39
Discharge: HOME | End: 2024-07-30
Payer: COMMERCIAL

## 2024-07-30 DIAGNOSIS — R92.8 ABNORMAL MAMMOGRAM: ICD-10-CM

## 2024-07-30 PROCEDURE — 76642 ULTRASOUND BREAST LIMITED: CPT | Mod: LEFT SIDE | Performed by: STUDENT IN AN ORGANIZED HEALTH CARE EDUCATION/TRAINING PROGRAM

## 2024-07-30 PROCEDURE — 77061 BREAST TOMOSYNTHESIS UNI: CPT | Mod: LT

## 2024-07-30 PROCEDURE — 76981 USE PARENCHYMA: CPT | Mod: LT,RT

## 2024-07-30 PROCEDURE — 76642 ULTRASOUND BREAST LIMITED: CPT | Mod: LT

## 2024-07-30 PROCEDURE — 77065 DX MAMMO INCL CAD UNI: CPT | Mod: LEFT SIDE | Performed by: STUDENT IN AN ORGANIZED HEALTH CARE EDUCATION/TRAINING PROGRAM

## 2024-07-30 PROCEDURE — 77061 BREAST TOMOSYNTHESIS UNI: CPT | Mod: LEFT SIDE | Performed by: STUDENT IN AN ORGANIZED HEALTH CARE EDUCATION/TRAINING PROGRAM

## 2024-08-06 ENCOUNTER — APPOINTMENT (OUTPATIENT)
Dept: ENDOCRINOLOGY | Facility: HOSPITAL | Age: 39
End: 2024-08-06
Payer: COMMERCIAL

## 2024-10-03 PROCEDURE — RXMED WILLOW AMBULATORY MEDICATION CHARGE

## 2024-10-08 ENCOUNTER — PHARMACY VISIT (OUTPATIENT)
Dept: PHARMACY | Facility: CLINIC | Age: 39
End: 2024-10-08
Payer: COMMERCIAL

## 2024-11-26 ENCOUNTER — APPOINTMENT (OUTPATIENT)
Dept: NEUROLOGY | Facility: CLINIC | Age: 39
End: 2024-11-26
Payer: COMMERCIAL

## 2024-11-26 VITALS
SYSTOLIC BLOOD PRESSURE: 120 MMHG | HEIGHT: 63 IN | HEART RATE: 70 BPM | DIASTOLIC BLOOD PRESSURE: 78 MMHG | BODY MASS INDEX: 28.34 KG/M2

## 2024-11-26 DIAGNOSIS — G43.109 MIGRAINE WITH VISUAL AURA: Primary | ICD-10-CM

## 2024-11-26 PROCEDURE — RXMED WILLOW AMBULATORY MEDICATION CHARGE

## 2024-11-26 PROCEDURE — 99213 OFFICE O/P EST LOW 20 MIN: CPT | Performed by: PSYCHIATRY & NEUROLOGY

## 2024-11-26 RX ORDER — RIZATRIPTAN BENZOATE 10 MG/1
10 TABLET ORAL ONCE AS NEEDED
Qty: 9 TABLET | Refills: 11 | Status: SHIPPED | OUTPATIENT
Start: 2024-11-26 | End: 2024-12-26

## 2024-11-26 NOTE — PATIENT INSTRUCTIONS
I'm glad to hear that your migraine headache frequency remains low and that rizatriptan works well.    I sent a refill order through to your [facility] pharmacy for rizatriptan.    We discussed that it is reasonable to follow-up with your PCP and as needed with general or headache neurology in the future.  If you remain in the current benign headache pattern and your PCP is okay with refilling rizatriptan in the future, then you do not need to see neurology on a regular basis.

## 2024-11-26 NOTE — PROGRESS NOTES
Nilton Alcaraz is a 39 y.o. year old female seen in follow-up for migraine with and without visual aura.    HPI    39-year-old right-handed woman with past medical history significant for Hashimoto's thyroiditis with associated hypothyroidism on replacement, vitamin D deficiency, childbirth x2.      I evaluated her initially on 5/22/2023.  As detailed in my ambulatory EMR note from that date she has a history of headaches dating to high school.  She has experienced a relatively low headache frequency and at the time of the initial visit did not endorse aura, although I subsequently elicited a history of occasional headaches with brief associated visual aura.  She seems to recall a sublingual triptan trial (unclear which agent) that she found too strong and accordingly I recommended a trial of naratriptan 1 mg strength.  Her frequency of headache was not to the point of warranting a daily preventive agent.     At the time I saw her initially she was pending a brain MRI recommended by her endocrinologist due to hormonal dysfunction with question of a pituitary process.  The MRI was an unremarkable study.    I evaluated her subsequently and most recently on 1/31/2024.  She endorsed an infrequent migraine pattern with only about 3 headaches requiring treatment per month, but a slow response to naratriptan with typically an hour needed before relief.  I suggested switching to rizatriptan 10 mg tablets.    She is evaluated again today in the office.    She reports a better response to rizatriptan, with quicker onset of relief compared to naratriptan.  She is tolerating it well without noted side effects.  She has not needed to repeat a dose.    At this point her migraine frequency is only about once every couple of months.  She has not been able to identify specific triggering factors recently.    She denies recent visual auras.  She did have an eye appointment recently and was given a prescription for dry  eye.  Her corrective lens prescription had changed.    She denies recent episodes of vertigo.    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    Cataract, cortical, left eye    Decreased libido    Female fertility problems    Headache, migraine    Hypoactive sexual desire disorder    Insulin resistance    Conjunctivitis, left eye    Irritation of left eye    Migraine without aura and without status migrainosus, not intractable    Missed period    Primary hypothyroidism    Recurrent erosion of cornea, left eye    Uterine fibroid    Vaginal discharge    Vitamin deficiency    Pregnancy (HHS-HCC)    Overweight with body mass index (BMI) of 26 to 26.9 in adult    Missed menses    Annual physical exam    UTI symptoms     Past Medical History:   Diagnosis Date    Disease of thyroid gland 2011    Personal history of other endocrine, nutritional and metabolic disease 06/02/2014    History of Hashimoto thyroiditis     Past Surgical History:   Procedure Laterality Date    OTHER SURGICAL HISTORY  06/02/2014    Dental Surgery    WISDOM TOOTH EXTRACTION  2005     Social History     Tobacco Use    Smoking status: Never    Smokeless tobacco: Never   Substance Use Topics    Alcohol use: Yes     Comment: Social very infrequent     family history includes Diabetes in her father; Heart disease in her father; Hypertension in her father; Mental illness in her mother.    Current Outpatient Medications:     levothyroxine (Synthroid, Levoxyl) 112 mcg tablet, Take 1 tablet daily except on Sundays take 2 tablets, Disp: 114 tablet, Rfl: 3    rizatriptan (Maxalt) 10 mg tablet, Take 1 tablet (10 mg) by mouth 1 time if needed for migraine (may repeat x1) for up to 180 doses. May repeat in 2 hours if unresolved. Do not exceed 30 mg in 24 hours., Disp: 9 tablet, Rfl: 5    testosterone 20.25 mg/1.25 gram (1.62 %) gel in metered-dose pump, Apply one pump weekly to  the back of your leg or inner thigh. Patient is aware that testosterone is not FDA approved for women and plans to use a Good Rx coupon. Please do not send a PA, Disp: 75 g, Rfl: 0  No Known Allergies    Objective   Neurological Exam  Physical Exam    Physical Examination:    General: Alert woman who was ambulatory without assistive devices.      Mental Status: Clear sensorium without fluctuation.  Appropriate in conversation.  Fluent unremarkable speech without paraphasic errors or hesitancy.    Cranial Nerves:  Funduscopic exam revealed sharp temporal disc margins bilaterally.  Pupils were equal, round and reactive to light with no relative afferent pupillary defect.  Visual fields were full to confrontation tested binocularly.  Facial movements were symmetrically intact.  Hearing was grossly intact.  No dysarthria.    Motor: There was no pronator drift or asymmetry of finger taps.     Coordination: There was no postural or rest tremor, myoclonus or dystonic posturing.     Sensation: Romberg sign was absent.     Station: Intact and stable.    Gait: Stable and unremarkable.  Intact tandem.        Assessment/Plan     Her migraine frequency remains low and she has noted a better response to rizatriptan in terms of speed of onset of action, compared to naratriptan.  I sent a refill order for rizatriptan to her [facility] pharmacy.    As she is doing well, I discussed that future follow-up with neurology can be on an as needed basis, assuming her PCP is okay with refilling rizatriptan if necessary down the road.  I did advise her to follow-up with headache or general neurology should her pattern worsen significantly in the future.

## 2024-12-04 ENCOUNTER — PHARMACY VISIT (OUTPATIENT)
Dept: PHARMACY | Facility: CLINIC | Age: 39
End: 2024-12-04
Payer: COMMERCIAL

## 2025-01-13 ENCOUNTER — OFFICE VISIT (OUTPATIENT)
Dept: ENDOCRINOLOGY | Facility: CLINIC | Age: 40
End: 2025-01-13
Payer: COMMERCIAL

## 2025-01-13 VITALS
BODY MASS INDEX: 28.7 KG/M2 | WEIGHT: 162 LBS | HEART RATE: 85 BPM | HEIGHT: 63 IN | DIASTOLIC BLOOD PRESSURE: 78 MMHG | SYSTOLIC BLOOD PRESSURE: 118 MMHG

## 2025-01-13 DIAGNOSIS — E03.9 HYPOTHYROIDISM, UNSPECIFIED TYPE: Primary | ICD-10-CM

## 2025-01-13 DIAGNOSIS — H04.123 DRY EYES: ICD-10-CM

## 2025-01-13 DIAGNOSIS — E56.9 VITAMIN DEFICIENCY: ICD-10-CM

## 2025-01-13 PROCEDURE — 99213 OFFICE O/P EST LOW 20 MIN: CPT | Performed by: STUDENT IN AN ORGANIZED HEALTH CARE EDUCATION/TRAINING PROGRAM

## 2025-01-13 PROCEDURE — 3008F BODY MASS INDEX DOCD: CPT | Performed by: STUDENT IN AN ORGANIZED HEALTH CARE EDUCATION/TRAINING PROGRAM

## 2025-01-13 PROCEDURE — RXMED WILLOW AMBULATORY MEDICATION CHARGE

## 2025-01-13 RX ORDER — PERFLUOROHEXYLOCTANE 1 MG/MG
1 SOLUTION OPHTHALMIC 2 TIMES DAILY
COMMUNITY
Start: 2025-01-01

## 2025-01-13 RX ORDER — LIFITEGRAST 50 MG/ML
1 SOLUTION/ DROPS OPHTHALMIC 2 TIMES DAILY
COMMUNITY
Start: 2025-01-06

## 2025-01-13 NOTE — PROGRESS NOTES
Subjective   Tomasa Alcaraz is a 39 y.o. female with hx of hypothyroidism coming in for follow up.   -Patient used to follow with Dr. Hooks (Last appointment 5/19/2023)  -Last seen by us on 1/09/2024. She was continued on the same dose of LT4 112 mcg 8 pills per week and was advised to take Vit d 2000 international units.   -Interval history: Labs obtained on 2/3 showed an elevated FT4 level at 1.66 and TSH of 3.04 (Patient shared that she typically gets her labs drawn ~ 1 hour before her blood work)  -Current regimen: On LT4 112 mcg 8 pills a week takes it appropriately. Not taking vit D   Was on testosterone but did not help a lot     In terms of ROS:  -Reports constant fatigue.  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  Reports poor sleep quality 3-4 hours at a time.  Frequently interrupted by her puppy will be 2  -Stable appetite and weight. Trying to cut down on sugar.   -No ocular sx- tearing, gritty sensation, itching started on eyedrops twice daily helping with symptoms. Much better than it was.   -No compressive symptoms: Hoarseness, trouble swallowing, or shortness of breath lying flat  -No change in bowel habits.  BM every other day without straining  -Denies muscle cramps or tingling  - Intermittent hot flashes, change in libido, night sweats  For hypothyroidism:  Clinically patient appears euthyroid.  She continues to complain of constant fatigue.  TSH was noted to be mildly higher than her baseline.  In the setting of her fatigue may consider uptitrating her dose to target TSH of approximately 2.  Of note, her free T4 is elevated likely due to checking blood work close to medication administration  -Continue Levothyroxine 112 mcg daily except Sunday take 2 pills  to be taken on an empty stomach on its own 30min before other meds or food   -Obtain TSH and free T4     Positive Chvostek  "sign:  Likely in the setting of vitamin D deficiency/insufficiency.  Patient history of vitamin D deficiency with last level checked in May 2023 at 27.  Last PTH was 56 with a corrected calcium of approximately 9.2.  She is currently denying any muscle cramps, numbness or tingling.  - RESTART vitamin D 2000 international units daily, counseled patient on appropriate sun exposure during the summertime  - Obtain RFP and PTH     RTC in 6-8 months  Review of Systems    Objective   /78 (BP Location: Right arm, Patient Position: Sitting, BP Cuff Size: Adult)   Pulse 85   Ht 1.6 m (5' 3\")   Wt 73.5 kg (162 lb)   BMI 28.70 kg/m²    Physical Exam    Lab Results   Component Value Date    TSH 2.71 07/18/2024    FREET4 1.98 (H) 07/18/2024       Assessment/Plan   There are no diagnoses linked to this encounter.    Hypothyroidism. This diagnosis was discussed and reviewed with the patient including the advantages of drug therapy.  {hypothyroid plan:98949}  Repeat s-TSH in 6-8 weeks.  The risks and benefits of my recommendations, as well as other treatment options, were discussed with the {:5061::\"patient\"} today. Questions were answered.  Follow up: {0-10:13891} {time units:11} and as needed.  "

## 2025-01-13 NOTE — PATIENT INSTRUCTIONS
Continue Levothyroxine 112 mcg daily except Sunday take 2 tablets  to be taken on an empty stomach on its own 30min before other meds or food  Continue Vitamin D 1000 international unit daily will let you know if you need more  Blood work    RTC in 1 year

## 2025-01-16 ENCOUNTER — PHARMACY VISIT (OUTPATIENT)
Dept: PHARMACY | Facility: CLINIC | Age: 40
End: 2025-01-16
Payer: COMMERCIAL

## 2025-01-23 ENCOUNTER — LAB (OUTPATIENT)
Dept: LAB | Facility: LAB | Age: 40
End: 2025-01-23
Payer: COMMERCIAL

## 2025-01-23 DIAGNOSIS — E03.9 HYPOTHYROIDISM, UNSPECIFIED TYPE: ICD-10-CM

## 2025-01-23 DIAGNOSIS — H04.123 DRY EYES: ICD-10-CM

## 2025-01-23 DIAGNOSIS — E56.9 VITAMIN DEFICIENCY: ICD-10-CM

## 2025-01-23 LAB
25(OH)D3 SERPL-MCNC: 30 NG/ML (ref 30–100)
ALBUMIN SERPL BCP-MCNC: 4.4 G/DL (ref 3.4–5)
ANION GAP SERPL CALC-SCNC: 10 MMOL/L (ref 10–20)
BUN SERPL-MCNC: 15 MG/DL (ref 6–23)
CALCIUM SERPL-MCNC: 9 MG/DL (ref 8.6–10.6)
CHLORIDE SERPL-SCNC: 107 MMOL/L (ref 98–107)
CO2 SERPL-SCNC: 28 MMOL/L (ref 21–32)
CREAT SERPL-MCNC: 0.84 MG/DL (ref 0.5–1.05)
EGFRCR SERPLBLD CKD-EPI 2021: 90 ML/MIN/1.73M*2
GLUCOSE SERPL-MCNC: 84 MG/DL (ref 74–99)
PHOSPHATE SERPL-MCNC: 2.7 MG/DL (ref 2.5–4.9)
POTASSIUM SERPL-SCNC: 4.2 MMOL/L (ref 3.5–5.3)
SODIUM SERPL-SCNC: 141 MMOL/L (ref 136–145)
T4 FREE SERPL-MCNC: 2.03 NG/DL (ref 0.78–1.48)
TSH SERPL-ACNC: 2.01 MIU/L (ref 0.44–3.98)

## 2025-01-23 PROCEDURE — 84443 ASSAY THYROID STIM HORMONE: CPT

## 2025-01-23 PROCEDURE — 80069 RENAL FUNCTION PANEL: CPT

## 2025-01-23 PROCEDURE — 82306 VITAMIN D 25 HYDROXY: CPT

## 2025-01-23 PROCEDURE — 36415 COLL VENOUS BLD VENIPUNCTURE: CPT

## 2025-01-23 PROCEDURE — 84445 ASSAY OF TSI GLOBULIN: CPT

## 2025-01-23 PROCEDURE — 84439 ASSAY OF FREE THYROXINE: CPT

## 2025-01-27 LAB — THYROID STIMULATING IMMUNOGLOBULIN: <89 % BASELINE

## 2025-02-03 ENCOUNTER — APPOINTMENT (OUTPATIENT)
Dept: PRIMARY CARE | Facility: CLINIC | Age: 40
End: 2025-02-03
Payer: COMMERCIAL

## 2025-02-03 VITALS
WEIGHT: 161.2 LBS | HEIGHT: 64 IN | BODY MASS INDEX: 27.52 KG/M2 | OXYGEN SATURATION: 98 % | TEMPERATURE: 98 F | HEART RATE: 78 BPM | DIASTOLIC BLOOD PRESSURE: 73 MMHG | SYSTOLIC BLOOD PRESSURE: 116 MMHG | RESPIRATION RATE: 18 BRPM

## 2025-02-03 DIAGNOSIS — Z23 ENCOUNTER FOR IMMUNIZATION: ICD-10-CM

## 2025-02-03 DIAGNOSIS — F32.0 MILD MAJOR DEPRESSION (CMS-HCC): ICD-10-CM

## 2025-02-03 DIAGNOSIS — Z00.00 ANNUAL PHYSICAL EXAM: Primary | ICD-10-CM

## 2025-02-03 PROCEDURE — 99396 PREV VISIT EST AGE 40-64: CPT | Performed by: FAMILY MEDICINE

## 2025-02-03 PROCEDURE — 90471 IMMUNIZATION ADMIN: CPT | Performed by: FAMILY MEDICINE

## 2025-02-03 PROCEDURE — 90715 TDAP VACCINE 7 YRS/> IM: CPT | Performed by: FAMILY MEDICINE

## 2025-02-03 PROCEDURE — RXMED WILLOW AMBULATORY MEDICATION CHARGE

## 2025-02-03 PROCEDURE — 1036F TOBACCO NON-USER: CPT | Performed by: FAMILY MEDICINE

## 2025-02-03 PROCEDURE — 99212 OFFICE O/P EST SF 10 MIN: CPT | Performed by: FAMILY MEDICINE

## 2025-02-03 PROCEDURE — 3008F BODY MASS INDEX DOCD: CPT | Performed by: FAMILY MEDICINE

## 2025-02-03 RX ORDER — BUPROPION HYDROCHLORIDE 150 MG/1
150 TABLET ORAL EVERY MORNING
Qty: 90 TABLET | Refills: 3 | Status: SHIPPED | OUTPATIENT
Start: 2025-02-03

## 2025-02-03 ASSESSMENT — PATIENT HEALTH QUESTIONNAIRE - PHQ9
SUM OF ALL RESPONSES TO PHQ9 QUESTIONS 1 AND 2: 2
10. IF YOU CHECKED OFF ANY PROBLEMS, HOW DIFFICULT HAVE THESE PROBLEMS MADE IT FOR YOU TO DO YOUR WORK, TAKE CARE OF THINGS AT HOME, OR GET ALONG WITH OTHER PEOPLE: SOMEWHAT DIFFICULT
2. FEELING DOWN, DEPRESSED OR HOPELESS: SEVERAL DAYS
1. LITTLE INTEREST OR PLEASURE IN DOING THINGS: SEVERAL DAYS

## 2025-02-03 ASSESSMENT — ENCOUNTER SYMPTOMS
LOSS OF SENSATION IN FEET: 0
OCCASIONAL FEELINGS OF UNSTEADINESS: 0
DEPRESSION: 0

## 2025-02-03 NOTE — PROGRESS NOTES
Subjective   Patient ID: Tomasa Alcaraz is a 39 y.o. female who presents for Annual Exam, Depression, and Migraine.  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   Eyes: no blurred vision and no eyesight problems.   ENT: no hearing loss, no nasal congestion, no nasal discharge, no hoarseness and no sore throat.   Cardiovascular: no chest pain, no intermittent leg claudication, no lower extremity edema, no palpitations and no syncope.   Respiratory: no cough, no shortness of breath during exertion, no shortness of breath at rest and no wheezing.   Gastrointestinal: no abdominal pain, no blood in stools, no constipation, no diarrhea, no melena, no nausea, no rectal pain and no vomiting.   Genitourinary: no dysuria, no change in urinary frequency, no urinary hesitancy, no feelings of urinary urgency and no vaginal discharge.   Musculoskeletal: no arthralgias,  no back pain and no myalgias.   Integumentary: no new skin lesions and no rashes.   Neurological: no difficulty walking, no headache, no limb weakness, no numbness and no tingling.   Psychiatric: no anxiety, no depression, no anhedonia and no substance use disorders.   Endocrine: no recent weight gain and no recent weight loss.   Hematologic/Lymphatic: no tendency for easy bruising and no swollen glands .    Objective    /73 (BP Location: Right arm, Patient Position: Sitting)   Pulse  "78   Temp 36.7 °C (98 °F) (Temporal)   Resp 18   Ht 1.626 m (5' 4\")   Wt 73.1 kg (161 lb 3.2 oz)   LMP 01/15/2025   SpO2 98%   BMI 27.67 kg/m²    Physical Exam  The patient appeared well nourished and normally developed. Vital signs as documented. Head exam is unremarkable. No scleral icterus or corneal arcus noted.  Pupils are equal round reactive to light extraocular movements are intact no hemorrhages noted on funduscopic exam mouth mucous membranes are moist no exudates ears canals clear TMs are gray pearly not injected nose no rhinorrhea or epistaxis Neck is without jugular venous distension, thyromegaly, or carotid bruits. Carotid upstrokes are brisk bilaterally. Lungs are clear to auscultation and percussion. Cardiac exam reveals the PMI to be normally sized and situated. Rhythm is regular. First and second heart sounds normal. No murmurs, rubs or gallops. Abdominal exam reveals normal bowel sounds, no masses, no organomegaly and no aortic enlargement. Extremities are nonedematous and both femoral and pedal pulses are normal.  Neurologic exam DTRs are equal bilaterally no focal deficits strength is symmetrical heme lymph no palpable lymph nodes in the neck axilla or groin    Assessment/Plan   Problem List Items Addressed This Visit       Annual physical exam - Primary     Routine annual medical exam  Biometric screening ordered  Continue annual exams         Relevant Orders    Comprehensive metabolic panel    Lipid panel    Hemoglobin A1C    Mild major depression (CMS-HCC)     Begin buproprion  Consider counseling for CBT  Followup 6 to 8 weeks            Relevant Medications    buPROPion XL (Wellbutrin XL) 150 mg 24 hr tablet     Other Visit Diagnoses       Encounter for immunization        Relevant Orders    Tdap vaccine, age 7 years and older (Completed)                 Blaire Frank MD  "

## 2025-02-04 ENCOUNTER — PHARMACY VISIT (OUTPATIENT)
Dept: PHARMACY | Facility: CLINIC | Age: 40
End: 2025-02-04
Payer: COMMERCIAL

## 2025-02-16 PROBLEM — Z34.90 PREGNANCY (HHS-HCC): Status: RESOLVED | Noted: 2023-10-21 | Resolved: 2025-02-16

## 2025-02-16 PROBLEM — G43.909 HEADACHE, MIGRAINE: Status: RESOLVED | Noted: 2023-10-21 | Resolved: 2025-02-16

## 2025-02-16 PROBLEM — F32.0 MILD MAJOR DEPRESSION (CMS-HCC): Status: ACTIVE | Noted: 2025-02-16

## 2025-03-03 PROCEDURE — RXMED WILLOW AMBULATORY MEDICATION CHARGE

## 2025-03-04 ENCOUNTER — PHARMACY VISIT (OUTPATIENT)
Dept: PHARMACY | Facility: CLINIC | Age: 40
End: 2025-03-04
Payer: COMMERCIAL

## 2025-03-18 LAB
ALBUMIN SERPL-MCNC: 4.4 G/DL (ref 3.6–5.1)
ALP SERPL-CCNC: 59 U/L (ref 31–125)
ALT SERPL-CCNC: 13 U/L (ref 6–29)
ANION GAP SERPL CALCULATED.4IONS-SCNC: 8 MMOL/L (CALC) (ref 7–17)
AST SERPL-CCNC: 13 U/L (ref 10–30)
BILIRUB SERPL-MCNC: 0.4 MG/DL (ref 0.2–1.2)
BUN SERPL-MCNC: 14 MG/DL (ref 7–25)
CALCIUM SERPL-MCNC: 9 MG/DL (ref 8.6–10.2)
CHLORIDE SERPL-SCNC: 105 MMOL/L (ref 98–110)
CHOLEST SERPL-MCNC: 194 MG/DL
CHOLEST/HDLC SERPL: 5.1 (CALC)
CO2 SERPL-SCNC: 26 MMOL/L (ref 20–32)
CREAT SERPL-MCNC: 0.98 MG/DL (ref 0.5–0.99)
EGFRCR SERPLBLD CKD-EPI 2021: 74 ML/MIN/1.73M2
EST. AVERAGE GLUCOSE BLD GHB EST-MCNC: 103 MG/DL
EST. AVERAGE GLUCOSE BLD GHB EST-SCNC: 5.7 MMOL/L
GLUCOSE SERPL-MCNC: 74 MG/DL (ref 65–99)
HBA1C MFR BLD: 5.2 % OF TOTAL HGB
HDLC SERPL-MCNC: 38 MG/DL
LDLC SERPL CALC-MCNC: 131 MG/DL (CALC)
NONHDLC SERPL-MCNC: 156 MG/DL (CALC)
POTASSIUM SERPL-SCNC: 4.1 MMOL/L (ref 3.5–5.3)
PROT SERPL-MCNC: 6.8 G/DL (ref 6.1–8.1)
SODIUM SERPL-SCNC: 139 MMOL/L (ref 135–146)
TRIGL SERPL-MCNC: 142 MG/DL

## 2025-03-26 ENCOUNTER — APPOINTMENT (OUTPATIENT)
Dept: PRIMARY CARE | Facility: CLINIC | Age: 40
End: 2025-03-26
Payer: COMMERCIAL

## 2025-04-09 PROCEDURE — RXMED WILLOW AMBULATORY MEDICATION CHARGE

## 2025-04-14 ENCOUNTER — PHARMACY VISIT (OUTPATIENT)
Dept: PHARMACY | Facility: CLINIC | Age: 40
End: 2025-04-14
Payer: COMMERCIAL

## 2025-04-23 ENCOUNTER — PHARMACY VISIT (OUTPATIENT)
Dept: PHARMACY | Facility: CLINIC | Age: 40
End: 2025-04-23
Payer: COMMERCIAL

## 2025-04-23 PROCEDURE — RXMED WILLOW AMBULATORY MEDICATION CHARGE

## 2025-05-09 ENCOUNTER — APPOINTMENT (OUTPATIENT)
Dept: PRIMARY CARE | Facility: CLINIC | Age: 40
End: 2025-05-09
Payer: COMMERCIAL

## 2025-05-13 ENCOUNTER — APPOINTMENT (OUTPATIENT)
Dept: PRIMARY CARE | Facility: CLINIC | Age: 40
End: 2025-05-13
Payer: COMMERCIAL

## 2025-05-13 VITALS
WEIGHT: 156.8 LBS | TEMPERATURE: 97.3 F | HEART RATE: 77 BPM | SYSTOLIC BLOOD PRESSURE: 106 MMHG | BODY MASS INDEX: 26.91 KG/M2 | OXYGEN SATURATION: 97 % | DIASTOLIC BLOOD PRESSURE: 74 MMHG

## 2025-05-13 DIAGNOSIS — F32.0 MILD MAJOR DEPRESSION: Primary | ICD-10-CM

## 2025-05-13 PROCEDURE — 99213 OFFICE O/P EST LOW 20 MIN: CPT | Performed by: FAMILY MEDICINE

## 2025-05-13 PROCEDURE — 1036F TOBACCO NON-USER: CPT | Performed by: FAMILY MEDICINE

## 2025-05-13 NOTE — PROGRESS NOTES
Subjective   Patient ID: Tomasa Alcaraz is a 40 y.o. female who presents for Follow-up (Patient presents for med follow up.).  Follow-up depression  Started on bupropion  Tolerating with no side effects  Feels well no chest pain no shortness of breath  Has also noticed that her libido improved        Review of Systems  Constitutional: no chills, no fever and no night sweats.   Eyes: no blurred vision and no eyesight problems.   ENT: no hearing loss, no nasal congestion, no nasal discharge, no hoarseness and no sore throat.   Cardiovascular: no chest pain, no intermittent leg claudication, no lower extremity edema, no palpitations and no syncope.   Respiratory: no cough, no shortness of breath during exertion, no shortness of breath at rest and no wheezing.   Gastrointestinal: no abdominal pain, no blood in stools, no constipation, no diarrhea, no melena, no nausea, no rectal pain and no vomiting.   Genitourinary: no dysuria, no change in urinary frequency, no urinary hesitancy, no feelings of urinary urgency and no vaginal discharge.   Musculoskeletal: no arthralgias,  no back pain and no myalgias.   Integumentary: no new skin lesions and no rashes.   Neurological: no difficulty walking, no headache, no limb weakness, no numbness and no tingling.   Psychiatric: no anxiety, no depression, no anhedonia and no substance use disorders.   Endocrine: no recent weight gain and no recent weight loss.   Hematologic/Lymphatic: no tendency for easy bruising and no swollen glands .    Objective    /74   Pulse 77   Temp 36.3 °C (97.3 °F)   Wt 71.1 kg (156 lb 12.8 oz)   SpO2 97%   BMI 26.91 kg/m²    Physical Exam  The patient appeared well nourished and normally developed. Vital signs as documented. Head exam is unremarkable. No scleral icterus or corneal arcus noted.  Pupils are equal round reactive to light extraocular movements are intact no hemorrhages noted on funduscopic exam mouth mucous membranes are moist no  exudates ears canals clear TMs are gray pearly not injected nose no rhinorrhea or epistaxis Neck is without jugular venous distension, thyromegaly, or carotid bruits. Carotid upstrokes are brisk bilaterally. Lungs are clear to auscultation and percussion. Cardiac exam reveals the PMI to be normally sized and situated. Rhythm is regular. First and second heart sounds normal. No murmurs, rubs or gallops. Abdominal exam reveals normal bowel sounds, no masses, no organomegaly and no aortic enlargement. Extremities are nonedematous and both femoral and pedal pulses are normal.  Neurologic exam DTRs are equal bilaterally no focal deficits strength is symmetrical heme lymph no palpable lymph nodes in the neck axilla or groin    Assessment/Plan   Problem List Items Addressed This Visit       Mild major depression - Primary    Stable and controlled on bupropion  Continue current plan of care                 Blaire Frank MD

## 2025-06-20 ENCOUNTER — HOSPITAL ENCOUNTER (OUTPATIENT)
Dept: RADIOLOGY | Facility: HOSPITAL | Age: 40
Discharge: HOME | End: 2025-06-20
Payer: COMMERCIAL

## 2025-06-20 DIAGNOSIS — Z12.31 SCREENING MAMMOGRAM FOR BREAST CANCER: ICD-10-CM

## 2025-06-20 PROCEDURE — 77067 SCR MAMMO BI INCL CAD: CPT

## 2025-07-26 DIAGNOSIS — E03.9 PRIMARY HYPOTHYROIDISM: ICD-10-CM

## 2025-07-26 PROCEDURE — RXMED WILLOW AMBULATORY MEDICATION CHARGE

## 2025-07-28 PROCEDURE — RXMED WILLOW AMBULATORY MEDICATION CHARGE

## 2025-07-28 RX ORDER — LEVOTHYROXINE SODIUM 112 UG/1
TABLET ORAL
Qty: 114 TABLET | Refills: 0 | Status: SHIPPED | OUTPATIENT
Start: 2025-07-28

## 2025-07-30 ENCOUNTER — PHARMACY VISIT (OUTPATIENT)
Dept: PHARMACY | Facility: CLINIC | Age: 40
End: 2025-07-30
Payer: COMMERCIAL